# Patient Record
Sex: MALE | Race: WHITE | Employment: FULL TIME | URBAN - METROPOLITAN AREA
[De-identification: names, ages, dates, MRNs, and addresses within clinical notes are randomized per-mention and may not be internally consistent; named-entity substitution may affect disease eponyms.]

---

## 2023-11-02 ENCOUNTER — HOSPITAL ENCOUNTER (OUTPATIENT)
Dept: PHYSICAL THERAPY | Age: 53
Setting detail: THERAPIES SERIES
Discharge: HOME | End: 2023-11-02
Payer: COMMERCIAL

## 2023-11-02 DIAGNOSIS — R53.1 FUNCTIONAL WEAKNESS: ICD-10-CM

## 2023-11-02 DIAGNOSIS — M25.60 LIMITED JOINT RANGE OF MOTION (ROM): ICD-10-CM

## 2023-11-02 DIAGNOSIS — R68.89 DECREASED ABILITY TO PERFORM ACTIVITIES: ICD-10-CM

## 2023-11-02 DIAGNOSIS — G47.9 DIFFICULTY SLEEPING: ICD-10-CM

## 2023-11-02 DIAGNOSIS — R60.9 SWELLING: ICD-10-CM

## 2023-11-02 DIAGNOSIS — Z78.9 NEED FOR HOME EXERCISE PROGRAM: ICD-10-CM

## 2023-11-02 DIAGNOSIS — Z56.89 DIFFICULTY PERFORMING WORK ACTIVITIES: ICD-10-CM

## 2023-11-02 DIAGNOSIS — R26.9 GAIT DIFFICULTY: ICD-10-CM

## 2023-11-02 DIAGNOSIS — R52 PAIN: Primary | ICD-10-CM

## 2023-11-02 DIAGNOSIS — R68.89 DECREASED ACTIVITY TOLERANCE: ICD-10-CM

## 2023-11-02 DIAGNOSIS — R68.89 DECREASED EXERCISE TOLERANCE: ICD-10-CM

## 2023-11-02 PROCEDURE — 97110 THERAPEUTIC EXERCISES: CPT

## 2023-11-02 PROCEDURE — 97162 PT EVAL MOD COMPLEX 30 MIN: CPT

## 2023-11-02 PROCEDURE — 97140 MANUAL THERAPY 1/> REGIONS: CPT

## 2023-11-02 NOTE — FLOWSHEET NOTE
will have a direct impact on the rate of recovery  The patient has associated co-morbidities along with primary diagnosis which significantly impact the rate of recovery and contribute to complexities that require skilled therapeutic intervention  The patient had a prior episode of outpatient therapy during this calendar year for a different condition. Current diagnosis requires skilled therapeutic intervention. Treatment/Activity Tolerance:  [x] Patient tolerated treatment well [] Patient limited by fatique  [] Patient limited by pain  [] Patient limited by other medical complications  [] Other:     Return to Play: NA    Prognosis for POC: [x] Good [] Fair  [] Poor    Patient requires continued skilled intervention: [x] Yes  [] No      GOALS     Patient stated goal: able to walk community distances w/o AD. Status: [] Progressing: [] Met: [] Not Met: [] Adjusted    Therapist goals for Patient:   Short Term Goals: To be achieved in: 2 weeks  Independent in HEP and progression per patient tolerance, in order to progress toward full function and prevent re-injury. Status: [] Progressing: [] Met: [] Not Met: [] Adjusted  Patient will have a decrease in pain to 1/10 to help  facilitate improvement in movement, function, and ADLs as indicated by functional deficits. Status: [] Progressing: [] Met: [] Not Met: [] Adjusted    Long Term Goals: To be achieved in: 8-16 weeks  Disability index score of 15% or less for the [facility] to assist with return top prior level of function. Status: [] Progressing: [] Met: [] Not Met: [] Adjusted  Improve ROM to WNL to allow for proper joint functioning as indicated by patients functional deficits.   Status: [] Progressing: [] Met: [] Not Met: [] Adjusted  Pt to improve strength to 4+/5 or better of proximal hip, posterior chain LE, quadriceps, gastroc/soleus, and hamstringsto allow for proper muscle and joint use in functional mobility, ADLs and prior level of function  Status:

## 2023-11-07 ENCOUNTER — HOSPITAL ENCOUNTER (OUTPATIENT)
Dept: PHYSICAL THERAPY | Age: 53
Setting detail: THERAPIES SERIES
Discharge: HOME | End: 2023-11-07
Payer: COMMERCIAL

## 2023-11-07 PROCEDURE — 97110 THERAPEUTIC EXERCISES: CPT

## 2023-11-07 PROCEDURE — 97140 MANUAL THERAPY 1/> REGIONS: CPT

## 2023-11-07 NOTE — FLOWSHEET NOTE
[address] [facility] [address] [facility]., [address], [address] [place] [address] office: [phone] fax: [phone]      Physical Therapy: TREATMENT/PROGRESS NOTE   Patient: Ailin Tyler (14 y.o. male)   Treatment Date: 2023   :  1970 MRN: [id number]   Visit #: 2   Insurance Allowable Auth Needed   20/yr []Yes    []No    Insurance: Payor: Marina Rebolledo / Plan: [email or web address]  / Product Type: *No Product type* /   Insurance ID: [id number] - (Commercial)  Secondary Insurance (if applicable):    Treatment Diagnosis:   No diagnosis found. Medical Diagnosis:    Pyogenic arthritis, unspecified [M00.9]  Cutaneous abscess of left lower limb [L02.416]  Pain in left leg [M79.605]  Other symptoms and signs involving the musculoskeletal system [R29.898]  Unspecified abnormalities of gait and mobility [R26.9]   Referring Physician: Carmelita Farfan MD  PCP: Jenaro Walter of care signed (Y/N):     Date of Patient follow up with Physician:      Progress Report/POC: EVAL today  POC update due: (10 visits /OR [address] Grmic[address], whichever is less)  2023     Neuropathy, diabetic (Cut and paste Precautions/Contraindications from [facility])    Preferred Language for Healthcare:   [x]English       []other:    SUBJECTIVE EXAMINATION     Patient Report/Comments: Patient reports knee has been slightly  more sore past few days. States has been doing his stretches at home.      Test used Initial score  2023   Pain Summary VAS     Functional questionnaire WOMAC     Other:                OBJECTIVE EXAMINATION     Observation:     Test measurements: see eval    Exercises/Interventions:     Therapeutic Ex (64349)  Resistance Sets/ Reps/ Time Completed Notes/Cues/Progressions   Cardio nustep L 2 5' x    Knee AAROM/ PROM Prone knee flex/ hang 5' x    Heel prop 2#per deana 2 ' x    LAQ 0 #  2 x 10 x    HS curl Red  2 x 10 x    ST heel/ toe raise >

## 2023-11-09 ENCOUNTER — HOSPITAL ENCOUNTER (OUTPATIENT)
Dept: PHYSICAL THERAPY | Age: 53
Setting detail: THERAPIES SERIES
Discharge: HOME | End: 2023-11-09
Payer: COMMERCIAL

## 2023-11-09 PROCEDURE — 97110 THERAPEUTIC EXERCISES: CPT

## 2023-11-09 PROCEDURE — 97140 MANUAL THERAPY 1/> REGIONS: CPT

## 2023-11-09 NOTE — FLOWSHEET NOTE
primary condition.)   The patient has generalized musculoskeletal conditions or a condition affecting multiple sites that will have a direct impact on the rate of recovery  The patient has associated co-morbidities along with primary diagnosis which significantly impact the rate of recovery and contribute to complexities that require skilled therapeutic intervention  The patient had a prior episode of outpatient therapy during this calendar year for a different condition. Current diagnosis requires skilled therapeutic intervention. Treatment/Activity Tolerance:  [x] Patient tolerated treatment well [] Patient limited by fatique  [] Patient limited by pain  [] Patient limited by other medical complications  [] Other:     Return to Play: NA    Prognosis for POC: [x] Good [] Fair  [] Poor    Patient requires continued skilled intervention: [x] Yes  [] No      GOALS     Patient stated goal: able to walk community distances w/o AD. Status: [] Progressing: [] Met: [] Not Met: [] Adjusted    Therapist goals for Patient:   Short Term Goals: To be achieved in: 2 weeks  Independent in HEP and progression per patient tolerance, in order to progress toward full function and prevent re-injury. Status: [] Progressing: [] Met: [] Not Met: [] Adjusted  Patient will have a decrease in pain to 1/10 to help  facilitate improvement in movement, function, and ADLs as indicated by functional deficits. Status: [] Progressing: [] Met: [] Not Met: [] Adjusted    Long Term Goals: To be achieved in: 8-16 weeks  Disability index score of 15% or less for the [facility] to assist with return top prior level of function. Status: [] Progressing: [] Met: [] Not Met: [] Adjusted  Improve ROM to WNL to allow for proper joint functioning as indicated by patients functional deficits.   Status: [] Progressing: [] Met: [] Not Met: [] Adjusted  Pt to improve strength to 4+/5 or better of proximal hip, posterior chain LE, quadriceps,

## 2023-11-14 ENCOUNTER — HOSPITAL ENCOUNTER (OUTPATIENT)
Dept: PHYSICAL THERAPY | Age: 53
Setting detail: THERAPIES SERIES
Discharge: HOME | End: 2023-11-14
Payer: COMMERCIAL

## 2023-11-14 PROCEDURE — 97140 MANUAL THERAPY 1/> REGIONS: CPT

## 2023-11-14 PROCEDURE — 97110 THERAPEUTIC EXERCISES: CPT

## 2023-11-14 NOTE — FLOWSHEET NOTE
[address] [facility], [address] office: [phone] fax: [phone]      Physical Therapy: TREATMENT/PROGRESS NOTE   Patient: William Troncoso (88 y.o. male)   Treatment Date: 2023   :  1970 MRN: [id number]   Visit #: 4   Insurance Allowable Auth Needed   20/yr []Yes    []No    Insurance: Payor: [employer] / Plan: [address]  / Product Type: *No Product type* /   Insurance ID: [id number] - (Commercial)  Secondary Insurance (if applicable):    Treatment Diagnosis:   No diagnosis found. Medical Diagnosis:    Pyogenic arthritis, unspecified [M00.9]  Cutaneous abscess of left lower limb [L02.416]  Pain in left leg [M79.605]  Other symptoms and signs involving the musculoskeletal system [R29.898]  Unspecified abnormalities of gait and mobility [R26.9]   Referring Physician: Soni Bradley MD  PCP: Elli Colbert of care signed (Y/N):     Date of Patient follow up with Physician:      Progress Report/POC: EVAL today  POC update due: (10 visits /OR [address], whichever is less)  2023     Neuropathy, diabetic (Cut and paste Precautions/Contraindications from [facility])    Preferred Language for Healthcare:   [x]English       []other:    SUBJECTIVE EXAMINATION     Patient Report/Comments: Patient reports knee has been sore for about 24 hours after last session. States he was able to go down his basement steps for the first time in a while yesterday.      Test used Initial score  2023   Pain Summary VAS      Functional questionnaire WOMAC      Other:       ROM   L AAROM:   @ start: 25* to 125*  @ end: lacks 15* LAAROM  At start 20 end lacks 15       OBJECTIVE EXAMINATION     Observation:     Test measurements: see eval    Exercises/Interventions:     Therapeutic Ex (07544)  Resistance Sets/ Reps/ Time Completed Notes/Cues/Progressions   Cardio rec bike  5' x Full rev

## 2023-11-16 ENCOUNTER — HOSPITAL ENCOUNTER (OUTPATIENT)
Dept: PHYSICAL THERAPY | Age: 53
Setting detail: THERAPIES SERIES
Discharge: HOME | End: 2023-11-16
Payer: COMMERCIAL

## 2023-11-16 PROCEDURE — 97110 THERAPEUTIC EXERCISES: CPT

## 2023-11-16 PROCEDURE — 97140 MANUAL THERAPY 1/> REGIONS: CPT

## 2023-11-16 NOTE — FLOWSHEET NOTE
[address] [facility] [place] [facility], [address] office: [phone] fax: [phone]      Physical Therapy: TREATMENT/PROGRESS NOTE   Patient: Abhay Reese (73 y.o. male)   Treatment Date: 2023   :  1970 MRN: [id number]   Visit #: 5   Insurance Allowable Auth Needed   20/yr []Yes    []No    Insurance: Payor: Ron Rota / Plan: [address]  / Product Type: *No Product type* /   Insurance ID: [id number] - (Commercial)  Secondary Insurance (if applicable):    Treatment Diagnosis:   No diagnosis found. Medical Diagnosis:    Pyogenic arthritis, unspecified [M00.9]  Cutaneous abscess of left lower limb [L02.416]  Pain in left leg [M79.605]  Other symptoms and signs involving the musculoskeletal system [R29.898]  Unspecified abnormalities of gait and mobility [R26.9]   Referring Physician: Shelia Go MD  PCP: Forrest Pichardo  care signed (Y/N):     Date of Patient follow up with Physician:      Progress Report/POC: EVAL today  POC update due: (10 visits /OR [address], whichever is less)  12/15/2023     Neuropathy, diabetic (Cut and paste Precautions/Contraindications from [facility])    Preferred Language for Healthcare:   [x]English       []other:    SUBJECTIVE EXAMINATION     Patient Report/Comments: Patient reports knee been staying a little stiff and sore. States he still needs UE support threw walker when ambulating.      Test used Initial score  2023   Pain Summary VAS      Functional questionnaire WOMAC      Other:       ROM   L AAROM:   @ start: 25* to 125*  @ end: lacks 15* LAAROM  At start 22 end lacks 15       OBJECTIVE EXAMINATION     Observation:     Test measurements: see eval    Exercises/Interventions:     Therapeutic Ex (41414)  Resistance Sets/ Reps/ Time Completed Notes/Cues/Progressions   Cardio rec bike  5' x Full rev per deana   Knee AAROM/ PROM Prone knee

## 2023-11-21 ENCOUNTER — HOSPITAL ENCOUNTER (OUTPATIENT)
Dept: PHYSICAL THERAPY | Age: 53
Setting detail: THERAPIES SERIES
Discharge: HOME | End: 2023-11-21
Payer: COMMERCIAL

## 2023-11-21 PROCEDURE — 97110 THERAPEUTIC EXERCISES: CPT

## 2023-11-21 PROCEDURE — 97140 MANUAL THERAPY 1/> REGIONS: CPT

## 2023-11-21 NOTE — FLOWSHEET NOTE
to progress weights and add new exercises   11/9: cont w/ strong emphasis on restoring extension ROM and quad activation. If lacking for another few visits, may consider knee extension splint as a supplement. Electronically Signed by Amaya Xavier, RAFAEL  [id number] Date: 11/21/2023     Note: If patient does not return for scheduled/recommended follow up visits, this note will serve as a discharge from care along with the most recent update on progress.

## 2023-11-28 ENCOUNTER — HOSPITAL ENCOUNTER (OUTPATIENT)
Dept: PHYSICAL THERAPY | Age: 53
Setting detail: THERAPIES SERIES
Discharge: HOME | End: 2023-11-28
Payer: COMMERCIAL

## 2023-11-28 PROCEDURE — 97110 THERAPEUTIC EXERCISES: CPT

## 2023-11-28 PROCEDURE — 97140 MANUAL THERAPY 1/> REGIONS: CPT

## 2023-11-28 NOTE — FLOWSHEET NOTE
[address] [facility], [address] office: [phone] fax: [phone]      Physical Therapy: TREATMENT/PROGRESS NOTE   Patient: Alcon Maria (80 y.o. male)   Treatment Date: 2023   :  1970 MRN: [id number]   Visit #: 7   Insurance Allowable Auth Needed   20/yr []Yes    []No    Insurance: Payor: [employer]  / Plan: [address]  / Product Type: *No Product type* /   Insurance ID: [id number] - (Commercial)  Secondary Insurance (if applicable):    Treatment Diagnosis:   No diagnosis found. Medical Diagnosis:    Pyogenic arthritis, unspecified [M00.9]  Cutaneous abscess of left lower limb [L02.416]  Pain in left leg [M79.605]  Other symptoms and signs involving the musculoskeletal system [R29.898]  Unspecified abnormalities of gait and mobility [R26.9]   Referring Physician: Carmen Schulte MD  PCP: Cherie Holder of care signed (Y/N):     Date of Patient follow up with Physician:      Progress Report/POC: NO  POC update due: (10 visits /OR [address], whichever is less)  2023     Neuropathy, diabetic (Cut and paste Precautions/Contraindications from [facility])    Preferred Language for Healthcare:   [x]English       []other:    SUBJECTIVE EXAMINATION     Patient Report/Comments: Pt enters with bilat crutches. States his knee is still wanting to give out.       Test used Initial score  2023   Pain Summary VAS       Functional questionnaire WOMAC       Other:        ROM  20* to 114* L AAROM:   @ start: 25* to 125*  @ end: lacks 15* LAAROM  At start 18 end lacks 13 Ext: Best: lacks 18*    Flexion: Best:        OBJECTIVE EXAMINATION     Observation:     Test measurements: see eval    Exercises/Interventions:     Therapeutic Ex (00209)  Resistance Sets/ Reps/ Time Completed Notes/Cues/Progressions   Cardio rec bike  5' x Full rev per deana   Knee AAROM/ PROM Prone

## 2023-11-30 ENCOUNTER — HOSPITAL ENCOUNTER (OUTPATIENT)
Dept: PHYSICAL THERAPY | Age: 53
Setting detail: THERAPIES SERIES
Discharge: HOME | End: 2023-11-30
Payer: COMMERCIAL

## 2023-11-30 PROCEDURE — 97110 THERAPEUTIC EXERCISES: CPT

## 2023-11-30 PROCEDURE — 97140 MANUAL THERAPY 1/> REGIONS: CPT

## 2023-11-30 NOTE — FLOWSHEET NOTE
ambulation    TREATMENT PLAN   Plan: Cont POC- Continue emphasis/focus on exercise progression, increasing ROM, and improving soft tissue extensibility. Next visit plan to progress weights and add new exercises   11/9: cont w/ strong emphasis on restoring extension ROM and quad activation. If lacking for another few visits, may consider knee extension splint as a supplement. Electronically Signed by Brissa Vogt, PTA [id number]  Date: 11/30/2023     Note: If patient does not return for scheduled/recommended follow up visits, this note will serve as a discharge from care along with the most recent update on progress.

## 2023-12-05 ENCOUNTER — HOSPITAL ENCOUNTER (OUTPATIENT)
Dept: PHYSICAL THERAPY | Age: 53
Setting detail: THERAPIES SERIES
Discharge: HOME | End: 2023-12-05
Payer: COMMERCIAL

## 2023-12-05 PROCEDURE — 97140 MANUAL THERAPY 1/> REGIONS: CPT

## 2023-12-05 PROCEDURE — 97110 THERAPEUTIC EXERCISES: CPT

## 2023-12-05 NOTE — FLOWSHEET NOTE
(Mobilization/manipulation, manual lymphatic drainage, manual traction) for the purpose of modulating pain, promoting relaxation,  increasing ROM, reducing/eliminating soft tissue swelling/inflammation/restriction, improving soft tissue extensibility and allowing for proper ROM for normal function with self care, mobility, lifting and ambulation    TREATMENT PLAN   Plan: Cont POC- Continue emphasis/focus on exercise progression, increasing ROM, and improving soft tissue extensibility. Next visit plan to progress weights and add new exercises   11/9: cont w/ strong emphasis on restoring extension ROM and quad activation. If lacking for another few visits, may consider knee extension splint as a supplement. 12/5: recommended he f/u w/ ortho regarding ongoing knee weakness and pain and poor WB deana. Cont PT in meantime addressing deficits. Electronically Signed by Alona Valverde PT                 Date: 12/05/2023     Note: If patient does not return for scheduled/recommended follow up visits, this note will serve as a discharge from care along with the most recent update on progress.

## 2023-12-07 ENCOUNTER — HOSPITAL ENCOUNTER (OUTPATIENT)
Dept: PHYSICAL THERAPY | Age: 53
Setting detail: THERAPIES SERIES
Discharge: HOME | End: 2023-12-07
Payer: COMMERCIAL

## 2023-12-07 PROCEDURE — 97110 THERAPEUTIC EXERCISES: CPT

## 2023-12-07 PROCEDURE — 97140 MANUAL THERAPY 1/> REGIONS: CPT

## 2023-12-07 NOTE — FLOWSHEET NOTE
one contact, billed in 15-minute increments. (36690) MANUAL THERAPY-  Manual therapy techniques, 1 or more regions, each 15 minutes (Mobilization/manipulation, manual lymphatic drainage, manual traction) for the purpose of modulating pain, promoting relaxation,  increasing ROM, reducing/eliminating soft tissue swelling/inflammation/restriction, improving soft tissue extensibility and allowing for proper ROM for normal function with self care, mobility, lifting and ambulation    TREATMENT PLAN   Plan: Cont POC- Continue emphasis/focus on exercise progression, increasing ROM, and improving soft tissue extensibility. Next visit plan to progress weights and add new exercises   11/9: cont w/ strong emphasis on restoring extension ROM and quad activation. If lacking for another few visits, may consider knee extension splint as a supplement. 12/5: recommended he f/u w/ ortho regarding ongoing knee weakness and pain and poor WB deana. Cont PT in meantime addressing deficits. Electronically Signed by Gabriela Syed PT                 Date: 12/07/2023     Note: If patient does not return for scheduled/recommended follow up visits, this note will serve as a discharge from care along with the most recent update on progress.

## 2023-12-12 ENCOUNTER — HOSPITAL ENCOUNTER (OUTPATIENT)
Dept: PHYSICAL THERAPY | Age: 53
Setting detail: THERAPIES SERIES
Discharge: HOME | End: 2023-12-12
Payer: COMMERCIAL

## 2023-12-12 PROCEDURE — 97110 THERAPEUTIC EXERCISES: CPT

## 2023-12-12 PROCEDURE — 97140 MANUAL THERAPY 1/> REGIONS: CPT

## 2023-12-12 NOTE — FLOWSHEET NOTE
Ther. Act (20360)    [] Faina Arias. Traction (56383)     [] Gait (50880)    [] Dry Needle 1-2 muscle (91755)     [] Aquatic Therex (28122)    [] Dry Needle 3+ muscle (02078)     [] Iontophoresis (04629)    [] VASO (89738)     [] Ultrasound (68200)    [] Group Therapy (81555)     [] Estim Attended (58756)    [] Other: Total Timed Code Tx Minutes 54        Total Treatment Minutes 54        Charge Justification:  (55567) THERAPEUTIC EXERCISE - Provided verbal/tactile cueing for activities related to strengthening, flexibility, endurance, ROM performed to prevent loss of range of motion, maintain or improve muscular strength or increase flexibility, following either an injury or surgery. (48343) [address] [facility]- Reviewed/Progressed HEP activities related to strengthening, flexibility, endurance, ROM performed to prevent loss of range of motion, maintain or improve muscular strength or increase flexibility, following either an injury or surgery. (74628) NEUROMUSCULAR RE-EDUCATION- Therapeutic procedure, 1 or more areas, each 15 minutes; neuromuscular reeducation of movement, balance, coordination, kinesthetic sense, posture, and/or proprioception for sitting and/or standing activities  (43524) [address] [facility]- Reviewed/Progressed HEP activities related to neuromuscular reeducation of movement, balance, coordination, kinesthetic sense, posture, and/or proprioception for sitting and/or standing activities    (61468) THERAPEUTIC ACTIVITY- use of dynamic activities to improve functional performance. (Ex include squatting, ascending/descending stairs, walking, bending, lifting, catching, throwing, pushing, pulling, jumping.)  Direct, one on one contact, billed in 15-minute increments.   (15333) MANUAL THERAPY-  Manual therapy techniques, 1 or more regions, each 15 minutes (Mobilization/manipulation, manual lymphatic drainage, manual traction) for the purpose of modulating pain, promoting relaxation,  increasing

## 2023-12-14 ENCOUNTER — HOSPITAL ENCOUNTER (OUTPATIENT)
Dept: PHYSICAL THERAPY | Age: 53
Setting detail: THERAPIES SERIES
Discharge: HOME | End: 2023-12-14
Payer: COMMERCIAL

## 2023-12-14 PROCEDURE — 97140 MANUAL THERAPY 1/> REGIONS: CPT

## 2023-12-14 PROCEDURE — 97110 THERAPEUTIC EXERCISES: CPT

## 2023-12-14 NOTE — FLOWSHEET NOTE
(55113) THERAPEUTIC ACTIVITY- use of dynamic activities to improve functional performance. (Ex include squatting, ascending/descending stairs, walking, bending, lifting, catching, throwing, pushing, pulling, jumping.)  Direct, one on one contact, billed in 15-minute increments. (70403) MANUAL THERAPY-  Manual therapy techniques, 1 or more regions, each 15 minutes (Mobilization/manipulation, manual lymphatic drainage, manual traction) for the purpose of modulating pain, promoting relaxation,  increasing ROM, reducing/eliminating soft tissue swelling/inflammation/restriction, improving soft tissue extensibility and allowing for proper ROM for normal function with self care, mobility, lifting and ambulation    TREATMENT PLAN   Plan: Cont POC- Continue emphasis/focus on exercise progression, increasing ROM, and improving soft tissue extensibility. Next visit plan to progress weights and add new exercises   11/9: cont w/ strong emphasis on restoring extension ROM and quad activation. If lacking for another few visits, may consider knee extension splint as a supplement. 12/5: recommended he f/u w/ ortho regarding ongoing knee weakness and pain and poor WB deana. Cont PT in meantime addressing deficits. Electronically Signed by Mable Feliciano, NATALIA     Date: 12/14/2023     Note: If patient does not return for scheduled/recommended follow up visits, this note will serve as a discharge from care along with the most recent update on progress.